# Patient Record
Sex: FEMALE | Race: OTHER | HISPANIC OR LATINO
[De-identification: names, ages, dates, MRNs, and addresses within clinical notes are randomized per-mention and may not be internally consistent; named-entity substitution may affect disease eponyms.]

---

## 2024-05-08 ENCOUNTER — RESULT REVIEW (OUTPATIENT)
Age: 19
End: 2024-05-08

## 2024-05-08 ENCOUNTER — APPOINTMENT (OUTPATIENT)
Dept: ORTHOPEDIC SURGERY | Facility: CLINIC | Age: 19
End: 2024-05-08
Payer: COMMERCIAL

## 2024-05-08 ENCOUNTER — OUTPATIENT (OUTPATIENT)
Dept: OUTPATIENT SERVICES | Facility: HOSPITAL | Age: 19
LOS: 1 days | End: 2024-05-08
Payer: COMMERCIAL

## 2024-05-08 VITALS
TEMPERATURE: 100 F | DIASTOLIC BLOOD PRESSURE: 80 MMHG | OXYGEN SATURATION: 97 % | SYSTOLIC BLOOD PRESSURE: 119 MMHG | HEIGHT: 62 IN | HEART RATE: 111 BPM

## 2024-05-08 DIAGNOSIS — M94.9 DISORDER OF BONE, UNSPECIFIED: ICD-10-CM

## 2024-05-08 DIAGNOSIS — M89.9 DISORDER OF BONE, UNSPECIFIED: ICD-10-CM

## 2024-05-08 PROCEDURE — 99204 OFFICE O/P NEW MOD 45 MIN: CPT

## 2024-05-08 PROCEDURE — 73630 X-RAY EXAM OF FOOT: CPT

## 2024-05-08 PROCEDURE — 73600 X-RAY EXAM OF ANKLE: CPT | Mod: 26,LT

## 2024-05-08 PROCEDURE — 73630 X-RAY EXAM OF FOOT: CPT | Mod: 26,50

## 2024-05-08 PROCEDURE — 73610 X-RAY EXAM OF ANKLE: CPT | Mod: 50

## 2024-05-08 PROCEDURE — 73600 X-RAY EXAM OF ANKLE: CPT

## 2024-05-10 NOTE — PHYSICAL EXAM
[de-identified] : General: Well-nourished, well-developed, alert, and in no acute distress. Head: Normocephalic. Eyes: Pupils equal, extraocular muscles intact, normal sclera. Nose: No nasal discharge. Cardiovascular: Extremities are warm and well perfused. Distal pulses are symmetric bilaterally. Respiratory: No labored breathing. Extremities: Sensation is intact distally bilaterally. Distal pulses are symmetric bilaterally Lymphatic: No regional lymphadenopathy, no lymphedema Neurologic: No focal deficits Skin: Normal skin color, texture, and turgor Psychiatric: Normal affect  MSK: Examination of [right] foot and ankle Gait [normal] Able to toe walk, heel walk Ambulating independently Inspection: No erythema, hematoma, ecchymosis or edema No calluses/corns/blisters/warts/paronychia or subungual hematoma No warmth Alignment:  Pes Planus   Nontender to palpation: medial malleolus, lateral malleolus, base of 5th metatarsal, navicular, ATFL, CFL, Achilles tendon, posterior tibialis, FHL, tibialis anterior, peroneals, plantar fascia   ROM: Plantar flexion 45 deg, dorsiflexion 20 deg, inversion 20 deg, eversion 20 deg   Special Tests:   Naz's click [negative] No Hallux valgus deformity No Valgus hindfoot deformity No Lani's deformity Anterior Drawer (ATFL): negative for pain and laxity Talar Tilt (CFL): negative for pain and laxity Kleigers (ext rot): negative for pain and laxity at deltoid ligament or distal fibula Squeeze test: negative at proximal and distal syndesmosis Bump test: negative at talar dome, syndesmosis Tinel's at tarsal tunnel negative Rebolledo test negative   Examination of [left] foot and ankle   Inspection: There is a well healed transverse scar over the lateral malleolus, and an arthroscopic port scar medially. No erythema, hematoma, ecchymosis or edema No calluses/corns/blisters/warts/paronychia or subungual hematoma No warmth Alignment: pes planus Tender to palpation: tibiotalar joint Nontender to palpation: medial malleolus, lateral malleolus, base of 5th metatarsal, navicular, ATFL, CFL, PTFL, AITFL, Achilles tendon, PT, FHL, tibialis anterior, peroneals, plantar fascia   ROM: Plantar flexion 45 deg, dorsiflexion 10 deg, inversion 20 deg, eversion 20 deg   Special Tests:   Naz's click negative No Hallux valgus deformity No Valgus hindfoot deformity No Lani's deformity Anterior Drawer (ATFL): negative for pain and laxity Talar Tilt (CFL): negative for pain and laxity Kleigers (ext rot): negative for pain and laxity at deltoid ligament or distal fibula Squeeze test: negative at proximal and distal syndesmosis Bump test: positive at talar dome Tinel's at tarsal tunnel negative Rebolledo test negative   Sensation is intact to light touch over the superficial and deep peroneal nerve distributions and the posterior tibial nerve distribution. Capillary refill is less than two seconds. Posterior tibial and dorsalis pedis pulses 2+ equal bilaterally. No calf swelling or tenderness bilaterally. Strength testing shows Hip flexion 5/5, Hip abduction 5/5, Hip abduction 5/5, Knee Extension 5/5, Knee Flexion 5/5, dorsiflexion 5/5, plantar flexion 5/5, EHL 5/5 Reflexes: Patellar 2+, Achilles 2+.

## 2024-05-10 NOTE — END OF VISIT
[FreeTextEntry3] : All medical record entries made by the Priscillaibgeoffrey were at my, Dr. Maryjane Mosley, direction and personally dictated by me on 05/06/2024. I have reviewed the chart and agree that the record accurately reflects my personal performance of the history, physical exam, assessment and plan. I have also personally directed, reviewed, and agreed with the chart. [Time Spent: ___ minutes] : I have spent [unfilled] minutes of time on the encounter.

## 2024-05-10 NOTE — DISCUSSION/SUMMARY

## 2024-05-10 NOTE — HISTORY OF PRESENT ILLNESS
[de-identified] : Milagros Bull 18 year old female presenting for a sprained ankle. 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 An x-ray was taken by the [employer] and she was given crutches and at the time, her ankle turned purplish/blackish. They told her that it was just a sprain and would get better. In December of 2021, she saw a  doctor who took an x-ray and told her that one of the ligament were torn. She states she did physical therapy but would only help a little bit and then the pain would come back. She did get a cortisone shot, which didn't' help relief the pain either. The patient states she had surgery in July of 2022 for collateral lateral ligaments. She ended up getting another MRI and Dr. Jessica Mac is suggesting another surgery. She states the primary pain is localized in the lateral aspect of the ankle.

## 2024-05-10 NOTE — ASSESSMENT
[FreeTextEntry1] : ESTELLE TRAMMELL is a 18 year old female with left ankle pain. 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 We reviewed the natural history of this condition and treatment options. We agreed on the following plan:   Xray taken and reviewed with patient today.  MRI from 2022 and 2024 reviewed with patient today. Images and reports provided.  Start Home Exercises for foot and ankle conditioning. Demonstration and handout provided. Physical therapy. Referral provided. Continue with ankle brace as needed.  Referral to foot and ankle specialist provided and patient provided with Dr. Moe contact information.  Advanced imaging: consider MRI if no symptomatic improvement. Follow up in 6-8 weeks.

## 2024-05-10 NOTE — ADDENDUM
[FreeTextEntry1] : Documented by Verito Kang acting a as a scribe for Dr. Maryjane Mosley. 05/06/2024.

## 2024-09-30 ENCOUNTER — NON-APPOINTMENT (OUTPATIENT)
Age: 19
End: 2024-09-30

## 2024-10-01 ENCOUNTER — APPOINTMENT (OUTPATIENT)
Dept: ORTHOPEDIC SURGERY | Facility: CLINIC | Age: 19
End: 2024-10-01
Payer: MEDICAID

## 2024-10-01 VITALS — WEIGHT: 160 LBS | HEIGHT: 62 IN | BODY MASS INDEX: 29.44 KG/M2

## 2024-10-01 DIAGNOSIS — M89.9 DISORDER OF BONE, UNSPECIFIED: ICD-10-CM

## 2024-10-01 DIAGNOSIS — M94.9 DISORDER OF BONE, UNSPECIFIED: ICD-10-CM

## 2024-10-01 PROCEDURE — 99203 OFFICE O/P NEW LOW 30 MIN: CPT

## 2024-10-01 NOTE — PHYSICAL EXAM
[Normal] : Gait: normal [de-identified] : General: No acute distress Mental: Alert and oriented x3 Eyes: Conjunctivitis not seen Chest: Symmetric chest rise, no audible wheezing Skin: Bilateral lower extremities absent from rashes and ulcers Abdomen: No distention  Left ankle: Skin: Clean, dry, intact, healed lateral incision Inspection: No obvious malalignment, mild swelling laterally, small effusion Pulses: 2+ DP/PT pulses  ROM: 10 degrees of dorsiflexion, 30 degrees of plantarflexion, clicking with circumduction Tenderness: Tenderness at the anterior tibiotalar joint.  No tenderness over the lateral malleolus, no CFL/ATFL/PTFL pain. No medial malleolus pain, no deltoid ligament pain. No proximal fibular pain. No heel pain.  Stability: Negative anterior/posterior drawer.   Strength: 5/5 TA/GS/EHL Neuro: Intact to light touch throughout  Additional tests: Negative Mortons test, Negative syndesmosis squeeze test [de-identified] : Prior left ankle x-rays show subchondral lesion at the lateral talar dome.  MRI left ankle/6/24 at [facility]: Subchondral lesion at the lateral talar dome.

## 2024-10-01 NOTE — DISCUSSION/SUMMARY
[de-identified] : 19-year-old female with chronic left ankle pain.  She previously underwent ligament reconstruction surgery in 2022.  She has persistent sharp pain along the anterolateral ankle joint, associated with swelling and stiffness.  Symptoms are secondary to osteochondral defect.  I recommended referral to Dr. Loera.

## 2024-10-01 NOTE — HISTORY OF PRESENT ILLNESS
[de-identified] : 19-year-old female with chronic left ankle pain.  She was previously in the  and sustained a left ankle injury that eventually resulted in surgery in July 2022.  She reports this was a ligament reconstruction surgery.  She did physical therapy before and after surgery.  She reports approximately 1 year of sharp left ankle pain, swelling, stiffness.  The pain is increased with walking.  She has limitations with exercise although she does go to the gym.  She uses ice.  Reports constant clicking in the ankle.  Denies ankle instability.  She saw another orthopedist in May and had an MRI of the left ankle in April 2024.

## 2024-10-17 ENCOUNTER — APPOINTMENT (OUTPATIENT)
Dept: ORTHOPEDIC SURGERY | Facility: CLINIC | Age: 19
End: 2024-10-17
Payer: MEDICAID

## 2024-10-17 DIAGNOSIS — M94.9 DISORDER OF BONE, UNSPECIFIED: ICD-10-CM

## 2024-10-17 DIAGNOSIS — M24.172 OTHER ARTICULAR CARTILAGE DISORDERS, LEFT ANKLE: ICD-10-CM

## 2024-10-17 DIAGNOSIS — Z98.890 OTHER SPECIFIED POSTPROCEDURAL STATES: ICD-10-CM

## 2024-10-17 DIAGNOSIS — M89.9 DISORDER OF BONE, UNSPECIFIED: ICD-10-CM

## 2024-10-17 PROCEDURE — 99204 OFFICE O/P NEW MOD 45 MIN: CPT

## 2024-10-17 PROCEDURE — 73610 X-RAY EXAM OF ANKLE: CPT | Mod: LT

## 2024-10-20 PROBLEM — Z98.890 HISTORY OF ANKLE SURGERY: Status: ACTIVE | Noted: 2024-10-20

## 2024-10-20 PROBLEM — M24.172 DERANGEMENT OF ANKLE, LEFT: Status: ACTIVE | Noted: 2024-10-20

## 2024-11-27 ENCOUNTER — APPOINTMENT (OUTPATIENT)
Dept: ORTHOPEDIC SURGERY | Facility: CLINIC | Age: 19
End: 2024-11-27

## 2024-12-01 ENCOUNTER — NON-APPOINTMENT (OUTPATIENT)
Age: 19
End: 2024-12-01

## 2024-12-04 ENCOUNTER — NON-APPOINTMENT (OUTPATIENT)
Age: 19
End: 2024-12-04

## 2025-01-16 ENCOUNTER — APPOINTMENT (OUTPATIENT)
Dept: ORTHOPEDIC SURGERY | Facility: CLINIC | Age: 20
End: 2025-01-16

## 2025-02-05 ENCOUNTER — APPOINTMENT (OUTPATIENT)
Dept: ORTHOPEDIC SURGERY | Facility: CLINIC | Age: 20
End: 2025-02-05
Payer: MEDICAID

## 2025-02-05 DIAGNOSIS — M89.9 DISORDER OF BONE, UNSPECIFIED: ICD-10-CM

## 2025-02-05 DIAGNOSIS — M94.9 DISORDER OF BONE, UNSPECIFIED: ICD-10-CM

## 2025-02-05 DIAGNOSIS — M24.172 OTHER ARTICULAR CARTILAGE DISORDERS, LEFT ANKLE: ICD-10-CM

## 2025-02-05 DIAGNOSIS — Z98.890 OTHER SPECIFIED POSTPROCEDURAL STATES: ICD-10-CM

## 2025-02-05 PROCEDURE — 77071 MNL APPL STRS JT RADIOGRAPHY: CPT

## 2025-02-05 PROCEDURE — 99213 OFFICE O/P EST LOW 20 MIN: CPT

## 2025-02-05 PROCEDURE — 73600 X-RAY EXAM OF ANKLE: CPT | Mod: LT

## 2025-04-01 ENCOUNTER — APPOINTMENT (OUTPATIENT)
Dept: ORTHOPEDIC SURGERY | Facility: CLINIC | Age: 20
End: 2025-04-01

## 2025-09-09 ENCOUNTER — NON-APPOINTMENT (OUTPATIENT)
Age: 20
End: 2025-09-09